# Patient Record
Sex: MALE | Race: WHITE | Employment: FULL TIME | URBAN - METROPOLITAN AREA
[De-identification: names, ages, dates, MRNs, and addresses within clinical notes are randomized per-mention and may not be internally consistent; named-entity substitution may affect disease eponyms.]

---

## 2020-06-29 ENCOUNTER — HOSPITAL ENCOUNTER (EMERGENCY)
Age: 45
Discharge: HOME | End: 2020-06-30
Attending: EMERGENCY MEDICINE
Payer: OTHER GOVERNMENT

## 2020-06-29 PROCEDURE — 99283 EMERGENCY DEPT VISIT LOW MDM: CPT

## 2020-06-29 RX ORDER — AMOXICILLIN AND CLAVULANATE POTASSIUM 875; 125 MG/1; MG/1
1 TABLET, FILM COATED ORAL ONCE
Status: COMPLETED | OUTPATIENT
Start: 2020-06-30 | End: 2020-06-30

## 2020-06-29 RX ORDER — AMOXICILLIN AND CLAVULANATE POTASSIUM 875; 125 MG/1; MG/1
1 TABLET, FILM COATED ORAL 2 TIMES DAILY
Qty: 14 TABLET | Refills: 0 | Status: SHIPPED | OUTPATIENT
Start: 2020-06-29 | End: 2020-07-06

## 2020-06-30 VITALS
WEIGHT: 192 LBS | BODY MASS INDEX: 27.49 KG/M2 | OXYGEN SATURATION: 97 % | TEMPERATURE: 98.2 F | SYSTOLIC BLOOD PRESSURE: 132 MMHG | HEIGHT: 70 IN | DIASTOLIC BLOOD PRESSURE: 94 MMHG | RESPIRATION RATE: 12 BRPM | HEART RATE: 77 BPM

## 2020-06-30 PROCEDURE — 6370000000 HC RX 637 (ALT 250 FOR IP): Performed by: EMERGENCY MEDICINE

## 2020-06-30 RX ADMIN — AMOXICILLIN AND CLAVULANATE POTASSIUM 1 TABLET: 875; 125 TABLET, FILM COATED ORAL at 00:04

## 2020-06-30 ASSESSMENT — PAIN SCALES - GENERAL: PAINLEVEL_OUTOF10: 2

## 2020-06-30 ASSESSMENT — PAIN DESCRIPTION - PAIN TYPE: TYPE: ACUTE PAIN

## 2020-06-30 ASSESSMENT — ENCOUNTER SYMPTOMS
SHORTNESS OF BREATH: 0
VOMITING: 0
NAUSEA: 0
ABDOMINAL PAIN: 0
SORE THROAT: 0
DIARRHEA: 0

## 2020-06-30 ASSESSMENT — PAIN DESCRIPTION - LOCATION: LOCATION: HAND

## 2020-06-30 NOTE — ED PROVIDER NOTES
[address] [facility] ED  [address] [facility] [address]  Phone: [phone]  Fax: [phone] [facility]          Pt Name: Carie Persaud  MRN: [id number]  [place] 1975  Date of evaluation: 6/29/2020      CHIEF COMPLAINT       Chief Complaint   Patient presents with   [facility]     indoor cat        HISTORY OF PRESENT ILLNESS       Carie Persaud is a 54 y.o. male who presents after being bitten and scratched by his cat. He reports the cat ran away for a few days and he found in a field. When he went to pick it up it attacked him. Patient reports his immunizations are up-to-date. The cat seems to be acting more at baseline at this time. Low risk for rabies. Patient was primarily concerned about infection. States he did cleanse the wounds thoroughly with soap and water and hydrogen peroxide. Denies other symptoms at this time. Occurred this evening. REVIEW OF SYSTEMS       Review of Systems   Constitutional: Negative for chills and fever. HENT: Negative for congestion and sore throat. Respiratory: Negative for shortness of breath. Cardiovascular: Negative for chest pain. Gastrointestinal: Negative for abdominal pain, diarrhea, nausea and vomiting. Musculoskeletal: Negative for neck pain. Skin: Positive for wound. Negative for rash. Neurological: Negative for weakness and numbness. PAST MEDICAL HISTORY    has a past medical history of Hyperlipidemia. SURGICAL HISTORY      has a past surgical history that includes Achilles tendon surgery (Right, 2014). CURRENT MEDICATIONS       Discharge Medication List as of 6/30/2020 12:10 AM          ALLERGIES     has No Known Allergies. FAMILY HISTORY     has no family status information on file. family history is not on file. SOCIAL HISTORY      reports that he has never smoked.  He has never used smokeless tobacco. He reports that he does not drink alcohol or use drugs.    PHYSICAL EXAM     INITIAL VITALS:  height is 5' 10\" (1.778 m) and weight is 87.1 kg (192 lb). His oral temperature is 98.2 °F (36.8 °C). His blood pressure is 132/94 (abnormal) and his pulse is 77. His respiration is 12 and oxygen saturation is 97%. Physical Exam   Constitutional: He appears well-developed and well-nourished. No distress. HENT:   Head: Normocephalic and atraumatic. Right Ear: External ear normal.   Left Ear: External ear normal.   Eyes: Lids are normal. Right eye exhibits no discharge. Left eye exhibits no discharge. Neck: No tracheal deviation present. Cardiovascular: Normal rate and regular rhythm. Pulmonary/Chest: Effort normal and breath sounds normal.   Abdominal: Soft. There is no abdominal tenderness. Musculoskeletal:      Comments: Right second digit is where the cat bit him. Few small puncture wounds but no open lesions. Few scratches noted on that hand as well. The left hand has a few more scratches but no lesions that are suturable. Otherwise rest of skin exam is unremarkable where the patient reports he was attacked. Neurological: He is alert. GCS eye subscore is 4. GCS verbal subscore is 5. GCS motor subscore is 6. Skin: Skin is warm and dry. Psychiatric: He has a normal mood and affect. His behavior is normal.         DIFFERENTIAL DIAGNOSIS/ MDM:     Plan at this time will be to start the patient on Augmentin. I do not feel further cleansing is necessary as I feel the patient did very good job of that prior to arriving. He is primarily concerned about infection and I am in agreement. We will keep him on Augmentin and advised that he watch closely for signs of infection. Advised to return sooner for any new or concerning symptoms or signs of infection and follow-up with his PCP. He is comfortable with the plan. His tetanus is up-to-date. I do not feel he is at risk for rabies.     There are no signs of infection, there are no foreign bodies within the wound, there are no signs of compartment syndrome. The pulses are 2/4. The motor is 5/5. The sensation is intact. The patient has full range of motion. The patient was instructed to follow up in 2-3 days with their family doctor for a wound check. We also discussed returning to the Emergency Department immediately if new or worsening symptoms occur. We have discussed the symptoms which are most concerning (e.g., increasing pain, fever, drainage from the wound or other signs of infection, numbness, weakness, color change or coldness to the extremity) that necessitate immediate return. The patient was advised to keep the wound clean and dry, they may apply antibiotic ointment to the wound. The patient understands that at this time there is no evidence for a more malignant underlying process, but the patient also understands that early in the process of an illness or injury, an emergency department workup can be falsely reassuring. Routine discharge counseling was given, and the patient understands that worsening, changing or persistent symptoms should prompt an immediate call or follow up with their primary physician or return to the emergency department. The importance of appropriate follow up was also discussed. I have reviewed the disposition diagnosis with the patient and or their family/guardian. I have answered their questions and given discharge instructions. They voiced understanding of these instructions and did not have any further questions or complaints. DIAGNOSTIC RESULTS     EKG: All EKG's are interpreted by the Emergency Department Physician who either signs or Co-signs this chart in the absence of a cardiologist.        RADIOLOGY:   I directly visualized the following  images and reviewed the radiologist interpretations:  No orders to display       No results found. LABS:  No results found for this visit on 06/29/20.     EMERGENCY DEPARTMENT COURSE:     The patient was given the following medications:  Orders Placed This Encounter   Medications    amoxicillin-clavulanate (AUGMENTIN) 875-125 MG per tablet 1 tablet    amoxicillin-clavulanate (AUGMENTIN) 875-125 MG per tablet     Sig: Take 1 tablet by mouth 2 times daily for 7 days     Dispense:  14 tablet     Refill:  0        Vitals:    Vitals:    06/30/20 0005   BP: (!) 132/94   Pulse: 77   Resp: 12   Temp: 98.2 °F (36.8 °C)   TempSrc: Oral   SpO2: 97%   Weight: 87.1 kg (192 lb)   Height: 5' 10\" (1.778 m)     -------------------------  BP: (!) 132/94, Temp: 98.2 °F (36.8 °C), Pulse: 77, Resp: 12      CONSULTS:    None    CRITICAL CARE:     None    PROCEDURES:    None    FINAL IMPRESSION      1.  Cat scratch    2. Cat bite, initial encounter          DISPOSITION/PLAN   DISPOSITION Decision To Discharge 06/30/2020 12:16:33 AM      Condition on Disposition    Improved    PATIENT REFERRED TO:  Sumit Pichardo MD  [facility] [phone]    Schedule an appointment as soon as possible for a visit in 2 days        DISCHARGE MEDICATIONS:  Discharge Medication List as of 6/30/2020 12:10 AM      START taking these medications    Details   amoxicillin-clavulanate (AUGMENTIN) 875-125 MG per tablet Take 1 tablet by mouth 2 times daily for 7 days, Disp-14 tablet, R-0Print             (Please note that portions of this note were completed with a voice recognition program.  Efforts were made to edit the dictations but occasionally words are mis-transcribed.)    Mai Chapman DO  Attending Emergency Physician        Mai Chapman DO  06/30/20 7915

## 2020-06-30 NOTE — ED NOTES
Pt presents to ed with c/o cat bite and scratches. States he obtained the bites and scratches while he was attempting to catch his indoor cat in a corn field. UPon arrival pt is able to ambulate and speak in full sentences without difficulty. There are small abrasions and puncture wounds noted to bilat hands and arms. No active bleeding noted at this time. Pt states the wounds were cleansed pta. Call light placed within reach, denies needs. Will continue to monitor.       Xander Ardon RN  06/30/20 [facility]  06/30/20 2170

## 2022-08-05 ENCOUNTER — HOSPITAL ENCOUNTER (EMERGENCY)
Age: 47
Discharge: HOME | End: 2022-08-05
Attending: EMERGENCY MEDICINE
Payer: OTHER GOVERNMENT

## 2022-08-05 VITALS
HEIGHT: 70 IN | OXYGEN SATURATION: 98 % | DIASTOLIC BLOOD PRESSURE: 97 MMHG | BODY MASS INDEX: 27.49 KG/M2 | RESPIRATION RATE: 16 BRPM | WEIGHT: 192 LBS | TEMPERATURE: 98.1 F | SYSTOLIC BLOOD PRESSURE: 135 MMHG | HEART RATE: 72 BPM

## 2022-08-05 DIAGNOSIS — N50.812 PAIN IN LEFT TESTICLE: Primary | ICD-10-CM

## 2022-08-05 PROCEDURE — 99282 EMERGENCY DEPT VISIT SF MDM: CPT

## 2022-08-05 ASSESSMENT — PAIN - FUNCTIONAL ASSESSMENT: PAIN_FUNCTIONAL_ASSESSMENT: NONE - DENIES PAIN

## 2022-08-05 ASSESSMENT — PAIN SCALES - GENERAL: PAINLEVEL_OUTOF10: 0

## 2022-08-05 NOTE — ED PROVIDER NOTES
[address] [facility] ED  [address] [facility] [place] [address]  Phone: [phone]  Fax: [phone] [facility] Name: Carlitos Ureña  MRN: [id number]  [place] 1975  Date of evaluation: 8/5/2022  Provider: Lilian Juarez PA-C    CHIEF COMPLAINT       Chief Complaint   Patient presents with    Groin Pain     Left testicular pain, has been wakeboarding 5 days ago and has been sore, having groin, left groin and ab pain, soreness at this time           HISTORY OF PRESENT ILLNESS  (Location/Symptom, Timing/Onset, Context/Setting, Quality, Duration, Modifying Factors, Severity.)   Carlitos Ureña is a 49 y.o. male who presents to the emergency department for evaluation of previous left testicular pain that he first started noting on Monday morning. Patient states that he was wakeboarding on Sunday without any injuries or feeling of overexertion. Patient states that he was having significant abdominal muscular tenderness beginning of this week as well. He reports that the pain was rating down into his testicle but at no time did he feel any abdominal/genital/testicular bulging. He denies any other chest/respiratory/back/urinary/stooling or any GI pain or symptoms. He denies any previous urological or testicular history. He reports that he called his PCP yesterday for reevaluation as he is going to be flying out to [place] within the next couple days, the PCP was unable to see him and they recommended that he come here for our evaluation. No other complaints. Nursing Notes were reviewed. REVIEW OF SYSTEMS    (2-9 systems for level 4, 10 or more for level 5)     Review of Systems   Constitutional: Negative. HENT: Negative. Eyes: Negative. Respiratory: Negative. Cardiovascular: Negative. Gastrointestinal: Negative. Musculoskeletal: Negative. Endocrine: Negative. Genitourinary: Negative. Skin: Negative. Allergic/Immunologic: Negative.    Neurological: age appropriate mentation and interaction. Skin: Skin is warm and dry. No rash noted. Psychiatric: Mood, memory, affect and judgment normal.       DIAGNOSTIC RESULTS     EKG: All EKG's are interpreted by the Emergency Department Physician who either signs or Co-signs this chart in the absence of a cardiologist.    Not indicated OR per attending note    RADIOLOGY:   Non-plain film images such as CT, Ultrasound and MRI are read by the radiologist. Plain radiographic images are visualized and preliminarily interpreted by the emergency physician with the below findings:      Interpretation per the Radiologist below, if available at the time of this note:    No orders to display           LABS:  Labs Reviewed - No data to display      All other labs were within normal range or not returned as of this dictation. EMERGENCY DEPARTMENT COURSE and DIFFERENTIAL DIAGNOSIS/MDM:   Vitals:    Vitals:    08/05/22 1501   BP: (!) 135/97   Pulse: 72   Resp: 16   Temp: 98.1 °F (36.7 °C)   TempSrc: Oral   SpO2: 98%   Weight: 87.1 kg (192 lb)   Height: 5' 10\" (1.778 m)       1545  Patient has no acute findings concerning for hernia, testicular torsion or any other lower abdominal/genital abnormalities. He reports he has not had any pain in about 2 and half days he just wanted to get checked out as he is flying out to [place] within the next couple days. I did  him on what symptoms should prompt timely reevaluation. I have reviewed the disposition diagnosis with the patient and or their family/guardian. I have answered their questions and given discharge instructions. They voiced understanding of these instructions and did not have any further questions or complaints. CONSULTS:  None    PROCEDURES:  None    Patient instructed to return to the emergency room if symptoms worsen, return, or any other concern right away which is agreed. FINAL IMPRESSION      1.  Pain in left testicle            DISPOSITION/PLAN DISPOSITION Decision To Discharge    CONDITION:  Stable    PATIENT REFERRED TO:  Dolly Kraus MD  [address]  [phone]    Schedule an appointment as soon as possible for a visit in 3 days  for re-evaluation of your symptoms    [facility] ED  [address] [place] [address]  [phone]  Go to   for worsening of symptoms      DISCHARGE MEDICATIONS:  There are no discharge medications for this patient.       (Please note that portions of this note were completed with a voice recognition program.  Efforts were made to edit the dictations but occasionally words are mis-transcribed.)    GILL Romero PA-C  08/05/22 7157

## 2022-08-05 NOTE — ED PROVIDER NOTES
Attending Supervisory Note/Shared Visit   I have personally performed a face to face diagnostic evaluation on this patient. I have reviewed the mid-levels findings and agree. History and Exam by me shows an alert and oriented patient seen with extender I agree with the assessment treatment plan and disposition  No groin pain is noted states that he feels fine he has had no prolonged pain or change in bowel or bladder function.     (Please note that portions of this note were completed with a voice recognition program.  Efforts were made to edit the dictations but occasionally words are mis-transcribed.)    Kayla Almanza MD  Attending Emergency Physician       Kayla Almanza MD  08/05/22 5573

## 2022-08-05 NOTE — DISCHARGE INSTRUCTIONS
Please seek reevaluation if you experience recurrence of significant testicular pain/swelling/rash or any associated fever, abdominal pain, abdominal swelling/bulging, nausea, vomiting or any difficulty with urination.     ACETAMINOPHEN (Tylenol):  [Pain relief, fever reducer]    Pediatric Dosing-    > 12 YRS OLD  =  Adult dosing    1 YR - 12 YRS OLD:  10-15 mg/kg dose every 4-6 hours as needed     DO NOT EXCEED 5 doses/24 hr    Birth - 1 YR OLD:   10-15 mg/kg dose every 6-8 hours as needed       Adult Dosin-650 mg every 4-6 hr as needed    1000 mg  every 4-6hr as needed; NMT 4 g/daily    Extended Relief: 2 caplets (1300 mg) every 8 hrs as needed    DO NOT EXCEED 4000mg DAILY !!!        IBUPROFEN  (Motrin, Advil):  [Anti-inflammatory, Pain relief and Fever reducer)    Pediatric Dosing:  10 mg/kg dose every 8 hours as needed    Adult Dosin-800mg every 8 hrs as needed    DO NOT EXCEED 2400mg DAILY !!!

## 2022-08-05 NOTE — ED NOTES
Patient provided with discharge instructions, prescriptions, and follow up information. Verbalized understanding. No IV access to discontinue. A&OX3. Steady gait noted at discharge. Wheelchair declined by patient.       Kimberly Ma RN  08/05/22 3443

## 2023-07-10 ENCOUNTER — HOSPITAL ENCOUNTER (EMERGENCY)
Age: 48
Discharge: HOME | End: 2023-07-10
Attending: EMERGENCY MEDICINE
Payer: OTHER GOVERNMENT

## 2023-07-10 ENCOUNTER — APPOINTMENT (OUTPATIENT)
Dept: GENERAL RADIOLOGY | Age: 48
End: 2023-07-10
Payer: OTHER GOVERNMENT

## 2023-07-10 VITALS
HEART RATE: 57 BPM | TEMPERATURE: 98 F | RESPIRATION RATE: 14 BRPM | OXYGEN SATURATION: 97 % | HEIGHT: 70 IN | WEIGHT: 195 LBS | SYSTOLIC BLOOD PRESSURE: 124 MMHG | DIASTOLIC BLOOD PRESSURE: 83 MMHG | BODY MASS INDEX: 27.92 KG/M2

## 2023-07-10 DIAGNOSIS — S46.209A INJURY OF TENDON OF BICEPS: Primary | ICD-10-CM

## 2023-07-10 PROCEDURE — 99283 EMERGENCY DEPT VISIT LOW MDM: CPT

## 2023-07-10 PROCEDURE — 73080 X-RAY EXAM OF ELBOW: CPT

## 2023-07-10 NOTE — ED TRIAGE NOTES
Right bicep pain a couple of days ago started after grabbing pole on boat pt reported that he heard a pop into bicep denies numbness and tingling into fingers, able to move arm without issues noted.

## 2023-07-10 NOTE — ED NOTES
Patient provided with discharge instructions, prescriptions, and follow up information. Verbalized understanding. No IV access to discontinue. A&OX3. Steady gait noted at discharge. Wheelchair declined by patient.       Ana Rueda RN  07/10/23 4400

## 2023-07-10 NOTE — DISCHARGE INSTRUCTIONS
Please follow-up tomorrow at your scheduled appointment with orthopedic at 2 PM.  You can try Tylenol or Motrin for pain.   If symptoms worsen or new concerns develop return to the emergency room

## 2023-07-10 NOTE — ED PROVIDER NOTES
abnormality of the right elbow. LABS:  No results found for this visit on 07/10/23. EMERGENCY DEPARTMENT COURSE:  Patient arrives with possible injury to his bicep tendon. Will offer pain medication. X-ray was ordered. I did provide apology as there is an extended wait I initially saw him in triage. I do not feel he has a complete rupture of the tendon with my clinical exam.  There is possible partial tear however. No acute finding on x-ray. Will provide orthopedic referral.  He can try conservative measures at home to include Tylenol and/or Motrin. He is to return for any worsening symptoms or new concerns    CONSULTS:  None    PROCEDURES:  None    FINAL IMPRESSION      1. Injury of tendon of biceps          DISPOSITION / PLAN     DISPOSITION Decision To Discharge    PATIENT REFERRED TO:  Ginger Kebede MD  [address] [place] [address]  [phone]    Schedule an appointment as soon as possible for a visit in 1 week        DISCHARGE MEDICATIONS:  There are no discharge medications for this patient.       ONDINA Arteaga CNP   Emergency Medicine Nurse Practitioner    (Please note that portions of this note were completed with a voice recognitionprogram.  Efforts were made to edit the dictations but occasionally words are mis-transcribed.)      ONDINA Arteaga CNP  07/11/23 0110
words are mis-transcribed.)    Hernan Frankel DO, DO  Attending Emergency Physician       Hernan Frankel DO  07/10/23 5890

## 2023-08-23 ENCOUNTER — ANESTHESIA EVENT (OUTPATIENT)
Dept: OPERATING ROOM | Age: 48
End: 2023-08-23
Payer: OTHER GOVERNMENT

## 2023-08-24 ENCOUNTER — APPOINTMENT (OUTPATIENT)
Dept: GENERAL RADIOLOGY | Age: 48
End: 2023-08-24
Attending: ORTHOPAEDIC SURGERY
Payer: OTHER GOVERNMENT

## 2023-08-24 ENCOUNTER — ANESTHESIA (OUTPATIENT)
Dept: OPERATING ROOM | Age: 48
End: 2023-08-24
Payer: OTHER GOVERNMENT

## 2023-08-24 ENCOUNTER — HOSPITAL ENCOUNTER (OUTPATIENT)
Age: 48
Setting detail: OUTPATIENT SURGERY
Discharge: HOME | End: 2023-08-24
Attending: ORTHOPAEDIC SURGERY | Admitting: ORTHOPAEDIC SURGERY
Payer: OTHER GOVERNMENT

## 2023-08-24 VITALS
OXYGEN SATURATION: 98 % | SYSTOLIC BLOOD PRESSURE: 150 MMHG | RESPIRATION RATE: 26 BRPM | HEART RATE: 84 BPM | BODY MASS INDEX: 28.4 KG/M2 | TEMPERATURE: 97.3 F | WEIGHT: 198.4 LBS | DIASTOLIC BLOOD PRESSURE: 97 MMHG | HEIGHT: 70 IN

## 2023-08-24 DIAGNOSIS — G89.18 ACUTE POSTOPERATIVE PAIN: Primary | ICD-10-CM

## 2023-08-24 PROCEDURE — 2500000003 HC RX 250 WO HCPCS: Performed by: NURSE ANESTHETIST, CERTIFIED REGISTERED

## 2023-08-24 PROCEDURE — 3700000001 HC ADD 15 MINUTES (ANESTHESIA): Performed by: ORTHOPAEDIC SURGERY

## 2023-08-24 PROCEDURE — 2580000003 HC RX 258: Performed by: ANESTHESIOLOGY

## 2023-08-24 PROCEDURE — 6370000000 HC RX 637 (ALT 250 FOR IP): Performed by: ANESTHESIOLOGY

## 2023-08-24 PROCEDURE — 7100000001 HC PACU RECOVERY - ADDTL 15 MIN: Performed by: ORTHOPAEDIC SURGERY

## 2023-08-24 PROCEDURE — 2580000003 HC RX 258: Performed by: ORTHOPAEDIC SURGERY

## 2023-08-24 PROCEDURE — 3600000012 HC SURGERY LEVEL 2 ADDTL 15MIN: Performed by: ORTHOPAEDIC SURGERY

## 2023-08-24 PROCEDURE — 7100000010 HC PHASE II RECOVERY - FIRST 15 MIN: Performed by: ORTHOPAEDIC SURGERY

## 2023-08-24 PROCEDURE — 2709999900 HC NON-CHARGEABLE SUPPLY: Performed by: ORTHOPAEDIC SURGERY

## 2023-08-24 PROCEDURE — 6360000002 HC RX W HCPCS: Performed by: ANESTHESIOLOGY

## 2023-08-24 PROCEDURE — 6360000002 HC RX W HCPCS: Performed by: ORTHOPAEDIC SURGERY

## 2023-08-24 PROCEDURE — 7100000000 HC PACU RECOVERY - FIRST 15 MIN: Performed by: ORTHOPAEDIC SURGERY

## 2023-08-24 PROCEDURE — 3600000002 HC SURGERY LEVEL 2 BASE: Performed by: ORTHOPAEDIC SURGERY

## 2023-08-24 PROCEDURE — C1713 ANCHOR/SCREW BN/BN,TIS/BN: HCPCS | Performed by: ORTHOPAEDIC SURGERY

## 2023-08-24 PROCEDURE — 6360000002 HC RX W HCPCS: Performed by: NURSE ANESTHETIST, CERTIFIED REGISTERED

## 2023-08-24 PROCEDURE — 3700000000 HC ANESTHESIA ATTENDED CARE: Performed by: ORTHOPAEDIC SURGERY

## 2023-08-24 PROCEDURE — 7100000011 HC PHASE II RECOVERY - ADDTL 15 MIN: Performed by: ORTHOPAEDIC SURGERY

## 2023-08-24 PROCEDURE — C9290 INJ, BUPIVACAINE LIPOSOME: HCPCS | Performed by: ORTHOPAEDIC SURGERY

## 2023-08-24 DEVICE — SYSTEM IMPL DST BICEPS REP DEL W/ BICEPS BTTN 7X10MM PEEK: Type: IMPLANTABLE DEVICE | Site: ARM | Status: FUNCTIONAL

## 2023-08-24 RX ORDER — OXYCODONE HYDROCHLORIDE 5 MG/1
5 TABLET ORAL
Status: COMPLETED | OUTPATIENT
Start: 2023-08-24 | End: 2023-08-24

## 2023-08-24 RX ORDER — MIDAZOLAM HYDROCHLORIDE 1 MG/ML
INJECTION INTRAMUSCULAR; INTRAVENOUS PRN
Status: DISCONTINUED | OUTPATIENT
Start: 2023-08-24 | End: 2023-08-24 | Stop reason: SDUPTHER

## 2023-08-24 RX ORDER — DEXAMETHASONE SODIUM PHOSPHATE 10 MG/ML
INJECTION, SOLUTION INTRAMUSCULAR; INTRAVENOUS PRN
Status: DISCONTINUED | OUTPATIENT
Start: 2023-08-24 | End: 2023-08-24 | Stop reason: SDUPTHER

## 2023-08-24 RX ORDER — PROPOFOL 10 MG/ML
INJECTION, EMULSION INTRAVENOUS PRN
Status: DISCONTINUED | OUTPATIENT
Start: 2023-08-24 | End: 2023-08-24 | Stop reason: SDUPTHER

## 2023-08-24 RX ORDER — ONDANSETRON 2 MG/ML
4 INJECTION INTRAMUSCULAR; INTRAVENOUS
Status: DISCONTINUED | OUTPATIENT
Start: 2023-08-24 | End: 2023-08-24 | Stop reason: HOSPADM

## 2023-08-24 RX ORDER — MAGNESIUM HYDROXIDE 1200 MG/15ML
LIQUID ORAL CONTINUOUS PRN
Status: COMPLETED | OUTPATIENT
Start: 2023-08-24 | End: 2023-08-24

## 2023-08-24 RX ORDER — SODIUM CHLORIDE 9 MG/ML
INJECTION INTRAVENOUS
Status: DISCONTINUED
Start: 2023-08-24 | End: 2023-08-24 | Stop reason: HOSPADM

## 2023-08-24 RX ORDER — FENTANYL CITRATE 50 UG/ML
50 INJECTION, SOLUTION INTRAMUSCULAR; INTRAVENOUS EVERY 5 MIN PRN
Status: DISCONTINUED | OUTPATIENT
Start: 2023-08-24 | End: 2023-08-24 | Stop reason: HOSPADM

## 2023-08-24 RX ORDER — OXYCODONE HYDROCHLORIDE AND ACETAMINOPHEN 5; 325 MG/1; MG/1
1-2 TABLET ORAL EVERY 4 HOURS PRN
Qty: 50 TABLET | Refills: 0
Start: 2023-08-24 | End: 2023-08-31

## 2023-08-24 RX ORDER — SODIUM CHLORIDE 0.9 % (FLUSH) 0.9 %
5-40 SYRINGE (ML) INJECTION PRN
Status: DISCONTINUED | OUTPATIENT
Start: 2023-08-24 | End: 2023-08-24 | Stop reason: HOSPADM

## 2023-08-24 RX ORDER — SODIUM CHLORIDE 9 MG/ML
INJECTION, SOLUTION INTRAVENOUS PRN
Status: DISCONTINUED | OUTPATIENT
Start: 2023-08-24 | End: 2023-08-24 | Stop reason: HOSPADM

## 2023-08-24 RX ORDER — ONDANSETRON 4 MG/1
4 TABLET, FILM COATED ORAL EVERY 6 HOURS PRN
Qty: 30 TABLET | Refills: 1
Start: 2023-08-24

## 2023-08-24 RX ORDER — SODIUM CHLORIDE 0.9 % (FLUSH) 0.9 %
5-40 SYRINGE (ML) INJECTION EVERY 12 HOURS SCHEDULED
Status: DISCONTINUED | OUTPATIENT
Start: 2023-08-24 | End: 2023-08-24 | Stop reason: HOSPADM

## 2023-08-24 RX ORDER — FENTANYL CITRATE 50 UG/ML
INJECTION, SOLUTION INTRAMUSCULAR; INTRAVENOUS PRN
Status: DISCONTINUED | OUTPATIENT
Start: 2023-08-24 | End: 2023-08-24 | Stop reason: SDUPTHER

## 2023-08-24 RX ORDER — LIDOCAINE HYDROCHLORIDE 20 MG/ML
INJECTION, SOLUTION EPIDURAL; INFILTRATION; INTRACAUDAL; PERINEURAL PRN
Status: DISCONTINUED | OUTPATIENT
Start: 2023-08-24 | End: 2023-08-24 | Stop reason: SDUPTHER

## 2023-08-24 RX ORDER — BUPIVACAINE HYDROCHLORIDE 2.5 MG/ML
INJECTION, SOLUTION EPIDURAL; INFILTRATION; INTRACAUDAL
Status: DISCONTINUED
Start: 2023-08-24 | End: 2023-08-24 | Stop reason: HOSPADM

## 2023-08-24 RX ORDER — FENTANYL CITRATE 50 UG/ML
25 INJECTION, SOLUTION INTRAMUSCULAR; INTRAVENOUS EVERY 5 MIN PRN
Status: DISCONTINUED | OUTPATIENT
Start: 2023-08-24 | End: 2023-08-24 | Stop reason: HOSPADM

## 2023-08-24 RX ORDER — LIDOCAINE HYDROCHLORIDE 10 MG/ML
1 INJECTION, SOLUTION EPIDURAL; INFILTRATION; INTRACAUDAL; PERINEURAL
Status: DISCONTINUED | OUTPATIENT
Start: 2023-08-25 | End: 2023-08-24

## 2023-08-24 RX ORDER — SODIUM CHLORIDE, SODIUM LACTATE, POTASSIUM CHLORIDE, CALCIUM CHLORIDE 600; 310; 30; 20 MG/100ML; MG/100ML; MG/100ML; MG/100ML
INJECTION, SOLUTION INTRAVENOUS CONTINUOUS
Status: DISCONTINUED | OUTPATIENT
Start: 2023-08-24 | End: 2023-08-24 | Stop reason: HOSPADM

## 2023-08-24 RX ORDER — LIDOCAINE HYDROCHLORIDE 10 MG/ML
1 INJECTION, SOLUTION EPIDURAL; INFILTRATION; INTRACAUDAL; PERINEURAL
Status: DISCONTINUED | OUTPATIENT
Start: 2023-08-24 | End: 2023-08-24 | Stop reason: HOSPADM

## 2023-08-24 RX ORDER — SODIUM CHLORIDE 9 MG/ML
INJECTION, SOLUTION INTRAVENOUS CONTINUOUS
Status: DISCONTINUED | OUTPATIENT
Start: 2023-08-24 | End: 2023-08-24 | Stop reason: HOSPADM

## 2023-08-24 RX ORDER — ATORVASTATIN CALCIUM 10 MG/1
1 TABLET, FILM COATED ORAL DAILY
COMMUNITY
Start: 2023-01-24

## 2023-08-24 RX ORDER — DOCUSATE SODIUM 100 MG/1
100 CAPSULE, LIQUID FILLED ORAL 2 TIMES DAILY
Qty: 30 CAPSULE | Refills: 0
Start: 2023-08-24

## 2023-08-24 RX ORDER — DIPHENHYDRAMINE HYDROCHLORIDE 50 MG/ML
12.5 INJECTION INTRAMUSCULAR; INTRAVENOUS
Status: DISCONTINUED | OUTPATIENT
Start: 2023-08-24 | End: 2023-08-24 | Stop reason: HOSPADM

## 2023-08-24 RX ADMIN — SODIUM CHLORIDE, POTASSIUM CHLORIDE, SODIUM LACTATE AND CALCIUM CHLORIDE: 600; 310; 30; 20 INJECTION, SOLUTION INTRAVENOUS at 13:27

## 2023-08-24 RX ADMIN — FENTANYL CITRATE 50 MCG: 50 INJECTION INTRAMUSCULAR; INTRAVENOUS at 17:07

## 2023-08-24 RX ADMIN — CEFAZOLIN 2000 MG: 10 INJECTION, POWDER, FOR SOLUTION INTRAVENOUS at 15:13

## 2023-08-24 RX ADMIN — OXYCODONE HYDROCHLORIDE 5 MG: 5 TABLET ORAL at 17:42

## 2023-08-24 RX ADMIN — DEXAMETHASONE SODIUM PHOSPHATE 10 MG: 10 INJECTION, SOLUTION INTRAMUSCULAR; INTRAVENOUS at 15:22

## 2023-08-24 RX ADMIN — FENTANYL CITRATE 50 MCG: 50 INJECTION INTRAMUSCULAR; INTRAVENOUS at 16:04

## 2023-08-24 RX ADMIN — LIDOCAINE HYDROCHLORIDE 100 MG: 20 INJECTION, SOLUTION EPIDURAL; INFILTRATION; INTRACAUDAL; PERINEURAL at 15:11

## 2023-08-24 RX ADMIN — MIDAZOLAM 2 MG: 1 INJECTION INTRAMUSCULAR; INTRAVENOUS at 15:08

## 2023-08-24 RX ADMIN — FENTANYL CITRATE 100 MCG: 50 INJECTION INTRAMUSCULAR; INTRAVENOUS at 15:11

## 2023-08-24 RX ADMIN — FENTANYL CITRATE 50 MCG: 50 INJECTION INTRAMUSCULAR; INTRAVENOUS at 15:42

## 2023-08-24 RX ADMIN — PROPOFOL 200 MG: 10 INJECTION, EMULSION INTRAVENOUS at 15:11

## 2023-08-24 ASSESSMENT — PAIN DESCRIPTION - PAIN TYPE
TYPE: SURGICAL PAIN

## 2023-08-24 ASSESSMENT — PAIN DESCRIPTION - DESCRIPTORS
DESCRIPTORS: DULL;DISCOMFORT
DESCRIPTORS: DULL;DISCOMFORT
DESCRIPTORS: DULL;DISCOMFORT;POUNDING
DESCRIPTORS: DULL
DESCRIPTORS: DULL;DISCOMFORT

## 2023-08-24 ASSESSMENT — PAIN DESCRIPTION - ORIENTATION
ORIENTATION: RIGHT

## 2023-08-24 ASSESSMENT — PAIN SCALES - GENERAL
PAINLEVEL_OUTOF10: 6
PAINLEVEL_OUTOF10: 4
PAINLEVEL_OUTOF10: 9

## 2023-08-24 ASSESSMENT — PAIN DESCRIPTION - FREQUENCY
FREQUENCY: CONTINUOUS

## 2023-08-24 ASSESSMENT — PAIN DESCRIPTION - LOCATION
LOCATION: ARM

## 2023-08-24 ASSESSMENT — PAIN - FUNCTIONAL ASSESSMENT: PAIN_FUNCTIONAL_ASSESSMENT: 0-10

## 2023-08-24 NOTE — DISCHARGE INSTRUCTIONS
Discharge to home  F/U 10 days in office  Call for Appt if not already made  Keep wound clean and dry  Change dressing PRN  Activity ice and elevate.   Keep sling intact    Lisa Mcgowan MD

## 2023-08-24 NOTE — H&P
History and Physical Update    Pt Name: Christian Shannon  MRN: [id number]  YOB: 1975  Date of evaluation: 8/24/2023    [x] I have examined the patient and reviewed the H&P/Consult and there are no changes to the patient or plans.     [] I have examined the patient and reviewed the H&P/Consult and have noted the following changes:        Payton Turner MD   Electronically signed 8/24/2023 at 2:08 PM

## 2023-08-24 NOTE — OP NOTE
Operative Note      Patient: Gabriela Alcaraz  YOB: 1975  MRN: [id number]    Date of Procedure: 8/24/2023    Pre-Op Diagnosis Codes:     * Biceps rupture, distal, right, initial encounter [S46.211A]    Post-Op Diagnosis:  High-grade partial distal biceps rupture       Procedure(s):  RIGHT DISTAL BICEPS TENDON REPAIR    Surgeon(s):  Claudio Carballo MD    Assistant:   Resident: Andreia Ramirez MD    Anesthesia: General    Estimated Blood Loss (mL): less than 50     Complications: None    Specimens:   * No specimens in log *    Implants:  Implant Name Type Inv. Item Serial No.  Lot No. LRB No. Used Action   SYSTEM IMPL DST BICEPS REP DEL W/ BICEPS BTTN [id number] PEEK - [id number]  SYSTEM IMPL DST BICEPS REP DEL W/ BICEPS BTTN 7X10MM PEEK  [employer] [id number] Right 1 Implanted         Drains: * No LDAs found *    Findings: Patient had significant tendinosis of the entire distal biceps tendon for a distance of approximately 2 to 3 cm. This portion of the tendon would not hold suture and needed to be debrided until a nice healthy tendon edge. Detailed Description of Procedure: This is a 25-year-old male that sustained an injury involving his right arm. MRI and physical exam supported the diagnosis of a high-grade partial tear of his distal biceps. Was felt that the patient would benefit from operative intervention. After informed consent was obtained the patient was brought to the operating room placed supine on the operating table and placed under general anesthesia. Patient has stockinette tourniquet placed around the right proximal arm and his leg was cleaned with a chlorhexidine soap. His arm was then prepared with ChloraPrep solution for 3 minutes drying time was draped in a sterile fashion. After the arm was prepped and draped a timeout was completed. After timeout was completed an Esmarch was used to exsanguinate the extremity and the tourniquet was elevated to 250.   With Patient resting comfortably in bed. Bed alarm is on, call light is within reach. Patient denies pain at this time. Will continue to monitor. the tourniquet up to a 3-1/2 cm incision was created in the flexion crease distally. Dissection was carried down through the skin to the level of the distal fascia. The biceps tendon and the lacertus fibrosis were identified. The biceps tendon was dissected down to the level of the radial tuberosity. The biceps was found to be partially torn and significantly tendon attic. The biceps was removed off of its attachment site using an elevator. After the biceps was elevated off of the radial tuberosity we debrided it and then initially whipstitched it but the whipstitch pulled right through the diseased remaining tendon. I needed to remove approximately 2 to 3 cm of tendon until we encountered a good tendon tip. I whipstitch this area and this held our sutures tightly. The tendon was sized to be a size 8. I did release the lacertus fibrosis in order to give us a little bit more excursion of our biceps tendon. We then exposed the radial tuberosity. The elevator was used to create subperiosteal spaces next to the radial tuberosity to allow for our baby Hohmann retractors. The hand was held in full supination as we exposed the radial tuberosity. Soft tissue was removed off of the tuberosity and a pin was placed. X-ray was taken confirming position of the pin in the radial tuberosity. After this was confirmed the K wire was removed for our Arthrex biceps button pin. This was placed through 2 cortices. With this in place we then reamed 8-1/2 mm over the guidepin creating a unicortical tunnel. After this we thoroughly irrigated the elbow removing any bone fragments. The pin was then removed and our biceps button was placed over our #2 FiberWire sutures. The button was passed through the posterior cortex and flipped and our graft was marked 10 mm and 10 mm of our graft was able to be pulled into our biceps tunnel.   A single limb of our suture was passed through the tendon and this was tied securing her

## (undated) DEVICE — SURGICAL PROCEDURE KIT BASIN MAJ SET UP

## (undated) DEVICE — C-ARM: Brand: UNBRANDED

## (undated) DEVICE — SUTURE FIBERLOOP SZ 2-0 L20IN NONABSORBABLE BLU STR NDL AR7234

## (undated) DEVICE — APPLICATOR MEDICATED 26 CC SOLUTION HI LT ORNG CHLORAPREP

## (undated) DEVICE — BANDAGE COBAN 4 IN COMPR W4INXL5YD FOAM COHESIVE QUIK STK SELF ADH SFT

## (undated) DEVICE — YANKAUER,FLEXIBLE HANDLE,REGLR CAPACITY: Brand: MEDLINE INDUSTRIES, INC.

## (undated) DEVICE — GLOVE SURG SZ 75 CRM LTX FREE POLYISOPRENE POLYMER BEAD ANTI

## (undated) DEVICE — BANDAGE COMPR W4INXL5YD WHT BGE POLY COT M E WRP WV HK AND

## (undated) DEVICE — Device

## (undated) DEVICE — DRAPE,U/ SHT,SPLIT,PLAS,STERIL: Brand: MEDLINE

## (undated) DEVICE — MARKER,SKIN,WI/RULER AND LABELS: Brand: MEDLINE

## (undated) DEVICE — SUTURE V-LOC 180 SZ 3-0 L6IN ABSRB GRN V-20 L26MM 1/2 CIR VLOCL0604

## (undated) DEVICE — TOWEL,OR,DSP,ST,BLUE,DLX,XR,4/PK,20PK/CS: Brand: MEDLINE

## (undated) DEVICE — DRAPE,HAND,STERILE: Brand: MEDLINE

## (undated) DEVICE — GOWN,SIRUS,NONRNF,SETINSLV,XL,20/CS: Brand: MEDLINE

## (undated) DEVICE — HYPODERMIC SAFETY NEEDLE: Brand: MAGELLAN

## (undated) DEVICE — BANDAGE,ELASTIC,ESMARK,STERILE,4"X9',LF: Brand: MEDLINE

## (undated) DEVICE — STOCKINETTE,IMPERVIOUS,12X48,STERILE: Brand: MEDLINE

## (undated) DEVICE — GAUZE,SPONGE,FLUFF,6"X6.75",STRL,5/TRAY: Brand: MEDLINE

## (undated) DEVICE — BANDAGE COMPR W4INXL5YD WHT COT POLY E VELC SELF CLSR DISP

## (undated) DEVICE — LIQUIBAND RAPID ADHESIVE 36/CS 0.8ML: Brand: MEDLINE

## (undated) DEVICE — COVER LT HNDL BLU PLAS

## (undated) DEVICE — PAD,ABDOMINAL,5"X9",ST,LF,25/BX: Brand: MEDLINE INDUSTRIES, INC.

## (undated) DEVICE — SPLINT QUICK STEP SCOTCHCAST 4 X 30

## (undated) DEVICE — GLOVE SURG SZ 8 L12IN FNGR THK79MIL GRN LTX FREE

## (undated) DEVICE — SUTURE VCRL SZ 2-0 L27IN ABSRB UD L26MM CT-2 1/2 CIR J269H

## (undated) DEVICE — TUBING, SUCTION, 1/4" X 12', STRAIGHT: Brand: MEDLINE

## (undated) DEVICE — GARMENT,MEDLINE,DVT,INT,CALF,MED, GEN2: Brand: MEDLINE

## (undated) DEVICE — STRIP SKIN CLSR W0.25XL4IN WHT SPUNBOUND FBR NYL HI ADH

## (undated) DEVICE — SUTURE VCRL SZ 0 L27IN ABSRB UD L26MM CT-2 1/2 CIR J270H